# Patient Record
Sex: MALE | Race: WHITE
[De-identification: names, ages, dates, MRNs, and addresses within clinical notes are randomized per-mention and may not be internally consistent; named-entity substitution may affect disease eponyms.]

---

## 2018-07-23 ENCOUNTER — HOSPITAL ENCOUNTER (EMERGENCY)
Dept: HOSPITAL 25 - ED | Age: 58
Discharge: HOME | End: 2018-07-23
Payer: COMMERCIAL

## 2018-07-23 VITALS — DIASTOLIC BLOOD PRESSURE: 86 MMHG | SYSTOLIC BLOOD PRESSURE: 140 MMHG

## 2018-07-23 DIAGNOSIS — Z23: ICD-10-CM
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
## 2018-07-23 DIAGNOSIS — S61.412A: Primary | ICD-10-CM
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
## 2018-07-23 PROCEDURE — 90471 IMMUNIZATION ADMIN: CPT

## 2018-07-23 PROCEDURE — 12002 RPR S/N/AX/GEN/TRNK2.6-7.5CM: CPT

## 2018-07-23 PROCEDURE — 90715 TDAP VACCINE 7 YRS/> IM: CPT

## 2018-07-23 PROCEDURE — 99283 EMERGENCY DEPT VISIT LOW MDM: CPT

## 2018-07-23 NOTE — ED
Skin Complaint





- HPI Summary


HPI Summary: 


Right-hand-dominant patient here with left home laceration prior to arrival.  

He reports he was working on a ladder at work and started to fall.  He avoided 

landing on the ground by catching himself on the door frame which he grabbed 

with his left hand.  As a result, he now has a laceration on his hand.  Denies 

numbness, tingling, weakness and can move fingers wrist and upper extremity 

without pain or difficulty.  He is unsure of his last tetanus vaccine.  He has 

not taken any medication prior to arrival.  Although he denies anticoagulants 

for bleeding disorders, he reports he bleeds quite a bit whenever he gets 

caught.  Denies easy bruising, epistaxis, bleeding gums.  Does not want 

anything for pain at this time.





- History of Current Complaint


Chief Complaint: EDLacSutureRecheck


Time Seen by Provider: 07/23/18 08:58


Stated Complaint: LT HAND LAC


Hx Obtained From: Patient


Pain Intensity: 2





- Allergy/Home Medications


Allergies/Adverse Reactions: 


 Allergies











Allergy/AdvReac Type Severity Reaction Status Date / Time


 


No Known Allergies Allergy   Verified 07/23/18 09:30











Home Medications: 


 Home Medications





Fexofenadine (NF) [Allegra (NF)] 60 mg PO DAILY 07/23/18 [History Confirmed 07/ 23/18]











PMH/Surg Hx/FS Hx/Imm Hx


Previously Healthy: Yes


Endocrine/Hematology History: 


   Denies: Hx Anticoagulant Therapy, Hx Blood Disorders - "bleed easily when I 

get cut", Hx Unexplained Bleeding


Cardiovascular History: 


   Denies: Hx Hypercholesterolemia, Hx Hypertension


GI History: 


   Denies: Hx Gastroesophageal Reflux Disease, Hx Gastrointestinal Bleed, Hx 

Ulcer


 History: 


   Denies: Hx Acute Renal Failure, Hx Chronic Renal Failure





- Surgical History


Surgery Procedure, Year, and Place: appe; left ankle screws placed





- Immunization History


Immunizations Up to Date: Unable to Obtain/Confirm


Infectious Disease History: No


Infectious Disease History: 


   Denies: Hx Clostridium Difficile, Hx Hepatitis, Hx Human Immunodeficiency 

Virus (HIV), Hx of Known/Suspected MRSA, Hx Shingles, Hx Tuberculosis, Hx Known/

Suspected VRE, Hx Known/Suspected VRSA, History Other Infectious Disease, 

Traveled Outside the [place] in Last 30 Days





- Social History


Occupation: Employed Full-time - costruction - lays insulation


Lives: With Family


Alcohol Use: Daily


Alcohol Amount: 6 PK PER DAY


Hx Substance Use: No


Substance Use Type: Reports: None


Hx Tobacco Use: No


Smoking Status (MU): Never Smoked Tobacco





Review of Systems


Constitutional: Negative


Positive: no symptoms reported


Musculoskeletal: Negative


Skin: Other - lac


Neurological: Negative


Psychological: Normal


All Other Systems Reviewed And Are Negative: Yes





Physical Exam


Triage Information Reviewed: Yes


Vital Signs On Initial Exam: 


 Initial Vitals











Temp Pulse Resp BP Pulse Ox


 


 97.8 F   72   16   170/107   94 


 


 07/23/18 08:33  07/23/18 08:33  07/23/18 08:33  07/23/18 08:33  07/23/18 08:33











Vital Signs Reviewed: Yes


Appearance: Positive: Well-Appearing, No Pain Distress, Well-Nourished


Skin: Positive: Warm, Skin Color Reflects Adequate Perfusion - L-shaped 

laceration over palmar surface of Lt hand


ENT: Positive: Hearing grossly normal


Respiratory/Lung Sounds: Positive: Breath Sounds Present


Cardiovascular: Positive: Pulses are Symmetrical in both Upper and Lower 

Extremities


Musculoskeletal: Positive: Normal, Strength/ROM Intact


Neurological: Positive: Normal, Sensory/Motor Intact, Alert, Oriented to Person 

Place, Time


Psychiatric: Positive: Normal





Procedures





- Laceration/Wound Repair


  ** 1


Location: upper extremity - Lt hand


Description: Linear - with jagged end


Anesthesia: Local, Lido - buffered w/ HCO3


Length, Depth and Shape: 5cm x 5mm


Betadine Prep?: Yes


Irrigated w/ Saline (ccs): 500


Laceration/Wound Explored: clean


Closure: Single Layer


Suture Type: Other - ethilon 5-0


Number of Sutures: 13


Layer Closure?: No


Sterile Dressing Applied?: Yes - triple anbx + gauze + ACE wrap - 

hemodynamically stable





Diagnostics





- Vital Signs


 Vital Signs











  Temp Pulse Resp BP Pulse Ox


 


 07/23/18 08:33  97.8 F  72  16  170/107  94














- Laboratory


Lab Statement: Any lab studies that have been ordered have been reviewed, and 

results considered in the medical decision making process.





Course/Dx





- Diagnoses


Provider Diagnoses: 


 Laceration of left hand








Discharge





- Sign-Out/Discharge


Documenting (check all that apply): Patient Departure





- Discharge Plan


Condition: Stable


Disposition: HOME


Prescriptions: 


Ibuprofen TAB* [Motrin TAB* 600 MG] 600 mg PO Q6H PRN #20 tab


 PRN Reason: Pain


Patient Education Materials:  Laceration (ED), Care For Your Stitches (ED)


Forms:  *Work Release


Referrals: 


[facility] [Outside]


Additional Instructions: 


Keep Dressing clean and dry and in place for the next 48 hours.  After that time

, you may remove dressing, gently wash wound with soap and water, rinse well 

and pat dry with clean cloth.  Reapply triple antibiotic ointment and clean 

gauze dressing.  Continue this daily until sutures are removed.  Call your PCP (

or [facility] - information provided here) to schedule wound recheck and 

suture removal in 10-14 days.





* If you develop redness, swelling, streaking, purulent drainage, fevers or 

chills, seek medical attention sooner or return to the emergency department.





- Billing Disposition and Condition


Condition: STABLE


Disposition: Home